# Patient Record
Sex: MALE | Race: WHITE | NOT HISPANIC OR LATINO | Employment: FULL TIME | RURAL
[De-identification: names, ages, dates, MRNs, and addresses within clinical notes are randomized per-mention and may not be internally consistent; named-entity substitution may affect disease eponyms.]

---

## 2022-06-30 ENCOUNTER — OFFICE VISIT (OUTPATIENT)
Dept: FAMILY MEDICINE CLINIC | Facility: CLINIC | Age: 22
End: 2022-06-30

## 2022-06-30 VITALS
SYSTOLIC BLOOD PRESSURE: 110 MMHG | OXYGEN SATURATION: 98 % | WEIGHT: 281 LBS | DIASTOLIC BLOOD PRESSURE: 80 MMHG | HEIGHT: 71 IN | BODY MASS INDEX: 39.34 KG/M2 | HEART RATE: 70 BPM

## 2022-06-30 DIAGNOSIS — R07.9 CHEST PAIN, UNSPECIFIED TYPE: ICD-10-CM

## 2022-06-30 DIAGNOSIS — R17 ELEVATED BILIRUBIN: Primary | ICD-10-CM

## 2022-06-30 PROCEDURE — 99203 OFFICE O/P NEW LOW 30 MIN: CPT | Performed by: PHYSICIAN ASSISTANT

## 2022-06-30 RX ORDER — DICLOFENAC SODIUM 75 MG/1
TABLET, DELAYED RELEASE ORAL
COMMUNITY
Start: 2022-06-25 | End: 2022-08-01

## 2022-06-30 NOTE — PROGRESS NOTES
"Chief Complaint  ER Follow Up    Subjective          Fish Paris presents to [facility]  Patient today for follow-up from his ER visit on 6/25/2022.  He went in for chest pain overall body aches, headache, and some numbness intermittently into right hand.  His labs were unremarkable except for minimal elevation to white count 1.3 and minimal elevation to bilirubin at 1.2.  EKG was unremarkable he was negative for COVID as well as mono testing.  He was given diclofenac to use as needed and he states that has helped his overall muscle aches.  He has continued to have some intermittent chest pain.  Denies shortness of breath.  Does have significant family history of heart disease with MI in his mom less than age of 50 and also MI in his maternal grandfather less than age of 40.  Patient does not smoke but currently vapes.  Denies any nausea, vomiting, or diarrhea.  Denies any dysuria.      Objective   Vital Signs:   /80   Pulse 70   Ht 180.3 cm (71\")   Wt 127 kg (281 lb)   SpO2 98%   BMI 39.19 kg/m²     Body mass index is 39.19 kg/m².    Review of Systems   Constitutional: Negative for chills and fever.   HENT: Negative for congestion and sore throat.    Respiratory: Negative for cough and shortness of breath.    Cardiovascular: Positive for chest pain. Negative for palpitations and leg swelling.   Gastrointestinal: Negative for abdominal pain, blood in stool, diarrhea, nausea and vomiting.   Genitourinary: Negative for dysuria and frequency.   Neurological: Negative for dizziness and headache.       Past History:  Medical History: has no past medical history on file.   Surgical History: has no past surgical history on file.   Family History: family history includes Depression in his father and mother; Hyperlipidemia in his father and mother; Hypertension in his father; Thyroid disease in his mother.   Social History: reports that he has never smoked. He uses smokeless " tobacco.      Current Outpatient Medications:   •  diclofenac (VOLTAREN) 75 MG EC tablet, , Disp: , Rfl:   Allergies: Patient has no known allergies.    Physical Exam  Constitutional:       Appearance: Normal appearance.   HENT:      Right Ear: Tympanic membrane normal.      Left Ear: Tympanic membrane normal.      Mouth/Throat:      Pharynx: Oropharynx is clear.   Eyes:      Conjunctiva/sclera: Conjunctivae normal.      Pupils: Pupils are equal, round, and reactive to light.   Cardiovascular:      Rate and Rhythm: Normal rate and regular rhythm.      Heart sounds: Normal heart sounds.   Pulmonary:      Effort: Pulmonary effort is normal.      Breath sounds: Normal breath sounds.   Abdominal:      Palpations: Abdomen is soft.      Tenderness: There is no abdominal tenderness.   Neurological:      Mental Status: He is oriented to person, place, and time.   Psychiatric:         Mood and Affect: Mood normal.         Behavior: Behavior normal.             Assessment and Plan   Diagnoses and all orders for this visit:    1. Elevated bilirubin (Primary)  -     CBC & Differential; Future  -     Comprehensive Metabolic Panel; Future  -     CBC & Differential  -     Comprehensive Metabolic Panel  We will recheck his bilirubin and CBC today in the office.  Encouraged patient to monitor his symptoms.  Discussed could be viral in nature.  If symptoms persist or progress, return to clinic or to ER if needed.  2. Chest pain, unspecified type  -     Ambulatory Referral to Cardiology  With continued intermittent chest pains along with family history of heart disease,  will set up with cardiology for further evaluation. RTC or ER prior if needed.       Follow Up   Return if symptoms worsen or fail to improve.  Patient was given instructions and counseling regarding his condition or for health maintenance advice. Please see specific information pulled into the AVS if appropriate.     Linda Fraire PA-C

## 2022-07-01 ENCOUNTER — TELEPHONE (OUTPATIENT)
Dept: CARDIOLOGY | Facility: CLINIC | Age: 22
End: 2022-07-01

## 2022-07-01 LAB
ALBUMIN SERPL-MCNC: 4.1 G/DL (ref 4.1–5.2)
ALBUMIN/GLOB SERPL: 1.5 {RATIO} (ref 1.2–2.2)
ALP SERPL-CCNC: 95 IU/L (ref 44–121)
ALT SERPL-CCNC: 18 IU/L (ref 0–44)
AST SERPL-CCNC: 19 IU/L (ref 0–40)
BASOPHILS # BLD AUTO: 0 X10E3/UL (ref 0–0.2)
BASOPHILS NFR BLD AUTO: 0 %
BILIRUB SERPL-MCNC: 0.3 MG/DL (ref 0–1.2)
BUN SERPL-MCNC: 10 MG/DL (ref 6–20)
BUN/CREAT SERPL: 13 (ref 9–20)
CALCIUM SERPL-MCNC: 9.5 MG/DL (ref 8.7–10.2)
CHLORIDE SERPL-SCNC: 105 MMOL/L (ref 96–106)
CO2 SERPL-SCNC: 24 MMOL/L (ref 20–29)
CREAT SERPL-MCNC: 0.79 MG/DL (ref 0.76–1.27)
EGFRCR SERPLBLD CKD-EPI 2021: 128 ML/MIN/1.73
EOSINOPHIL # BLD AUTO: 0.1 X10E3/UL (ref 0–0.4)
EOSINOPHIL NFR BLD AUTO: 1 %
ERYTHROCYTE [DISTWIDTH] IN BLOOD BY AUTOMATED COUNT: 12.9 % (ref 11.6–15.4)
GLOBULIN SER CALC-MCNC: 2.7 G/DL (ref 1.5–4.5)
GLUCOSE SERPL-MCNC: 88 MG/DL (ref 65–99)
HCT VFR BLD AUTO: 44.9 % (ref 37.5–51)
HGB BLD-MCNC: 15.7 G/DL (ref 13–17.7)
IMM GRANULOCYTES # BLD AUTO: 0 X10E3/UL (ref 0–0.1)
IMM GRANULOCYTES NFR BLD AUTO: 1 %
LYMPHOCYTES # BLD AUTO: 2 X10E3/UL (ref 0.7–3.1)
LYMPHOCYTES NFR BLD AUTO: 24 %
MCH RBC QN AUTO: 29.2 PG (ref 26.6–33)
MCHC RBC AUTO-ENTMCNC: 35 G/DL (ref 31.5–35.7)
MCV RBC AUTO: 84 FL (ref 79–97)
MONOCYTES # BLD AUTO: 0.5 X10E3/UL (ref 0.1–0.9)
MONOCYTES NFR BLD AUTO: 6 %
NEUTROPHILS # BLD AUTO: 5.4 X10E3/UL (ref 1.4–7)
NEUTROPHILS NFR BLD AUTO: 68 %
PLATELET # BLD AUTO: 318 X10E3/UL (ref 150–450)
POTASSIUM SERPL-SCNC: 4.3 MMOL/L (ref 3.5–5.2)
PROT SERPL-MCNC: 6.8 G/DL (ref 6–8.5)
RBC # BLD AUTO: 5.38 X10E6/UL (ref 4.14–5.8)
SODIUM SERPL-SCNC: 144 MMOL/L (ref 134–144)
WBC # BLD AUTO: 8 X10E3/UL (ref 3.4–10.8)

## 2022-07-01 NOTE — TELEPHONE ENCOUNTER
LEFT MESSAGE TO SCHEDULE NEW PATIENT VISIT WITH NICOLE MICHAELS NEXT WEEK. HUB MAY SCHEDULE WITH NICOLE MICHAELS OR TRANSFER TO OFFICE

## 2022-08-01 ENCOUNTER — TELEMEDICINE (OUTPATIENT)
Dept: FAMILY MEDICINE CLINIC | Facility: CLINIC | Age: 22
End: 2022-08-01

## 2022-08-01 VITALS — WEIGHT: 275 LBS | HEIGHT: 70 IN | BODY MASS INDEX: 39.37 KG/M2

## 2022-08-01 DIAGNOSIS — Z79.899 HIGH RISK MEDICATION USE: Primary | ICD-10-CM

## 2022-08-01 DIAGNOSIS — F33.1 MAJOR DEPRESSIVE DISORDER, RECURRENT, MODERATE: ICD-10-CM

## 2022-08-01 DIAGNOSIS — F41.9 ANXIETY: ICD-10-CM

## 2022-08-01 PROCEDURE — 99213 OFFICE O/P EST LOW 20 MIN: CPT | Performed by: PHYSICIAN ASSISTANT

## 2022-08-01 RX ORDER — ESCITALOPRAM OXALATE 10 MG/1
10 TABLET ORAL DAILY
Qty: 30 TABLET | Refills: 0 | Status: SHIPPED | OUTPATIENT
Start: 2022-08-01 | End: 2022-08-31 | Stop reason: SDUPTHER

## 2022-08-01 RX ORDER — HYDROXYZINE HYDROCHLORIDE 10 MG/1
TABLET, FILM COATED ORAL
Qty: 20 TABLET | Refills: 0 | Status: SHIPPED | OUTPATIENT
Start: 2022-08-01
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
## 2022-08-02 ENCOUNTER — TELEPHONE (OUTPATIENT)
Dept: FAMILY MEDICINE CLINIC | Facility: CLINIC | Age: 22
End: 2022-08-02

## 2022-08-02 ENCOUNTER — OFFICE VISIT (OUTPATIENT)
Dept: FAMILY MEDICINE CLINIC | Facility: CLINIC | Age: 22
End: 2022-08-02

## 2022-08-02 DIAGNOSIS — J06.9 ACUTE URI: Primary | ICD-10-CM

## 2022-08-02 LAB
EXPIRATION DATE: ABNORMAL
FLUAV AG UPPER RESP QL IA.RAPID: NOT DETECTED
FLUBV AG UPPER RESP QL IA.RAPID: NOT DETECTED
INTERNAL CONTROL: ABNORMAL
Lab: ABNORMAL
SARS-COV-2 RNA RESP QL NAA+PROBE: DETECTED

## 2022-08-02 PROCEDURE — 87428 SARSCOV & INF VIR A&B AG IA: CPT | Performed by: NURSE PRACTITIONER

## 2022-08-02 PROCEDURE — 99213 OFFICE O/P EST LOW 20 MIN: CPT | Performed by: NURSE PRACTITIONER

## 2022-08-02 NOTE — ASSESSMENT & PLAN NOTE
Flu negative.  COVID-positive.  Informed patient to isolate for the next 5 days.  Tylenol and ibuprofen as needed pain fever.  Mucinex DM as needed cough congestion.  Fluids and rest encouraged.  Return in 5 days if no improvement, sooner if worsens.  Monitor O2 levels and go to the ED if gets below 93% or if develops severe symptoms of chest pain chest pressure shortness of breath or trouble breathing.  Return to clinic or ED with any issues or concerns.

## 2022-08-02 NOTE — PROGRESS NOTES
You have chosen to receive care through a telehealth visit.  Do you consent to use a video/audio connection for your medical care today? Yes     This was an audio and video enabled telemedicine encounter.     Chief Complaint  Cough    Subjective          Fish Paris presents to [facility]  History of Present Illness     Dates yesterday he started having chills fatigue and cough.  States his wife tested positive for COVID yesterday.  He has not been vaccinated.  No chest pain no chest pressure no shortness of breath no trouble breathing.        Objective   Vital Signs:   There were no vitals taken for this visit.    There is no height or weight on file to calculate BMI.    Review of Systems   Constitutional: Positive for chills and fatigue.   HENT: Positive for congestion.    Eyes: Negative for visual disturbance.   Respiratory: Positive for cough. Negative for shortness of breath and wheezing.    Cardiovascular: Negative for chest pain and leg swelling.   Gastrointestinal: Negative for abdominal pain, diarrhea, nausea and vomiting.   Genitourinary: Negative for dysuria.   Musculoskeletal: Negative for back pain.   Neurological: Negative for dizziness and headache.   Psychiatric/Behavioral: Negative for suicidal ideas.       Past History:  Medical History: has no past medical history on file.   Surgical History: has no past surgical history on file.   Family History: family history includes Depression in his father and mother; Hyperlipidemia in his father and mother; Hypertension in his father; Thyroid disease in his mother.   Social History: reports that he quit smoking about 4 years ago. His smoking use included cigarettes. He started smoking about 7 years ago. He smoked 0.50 packs per day. He has never used smokeless tobacco. He reports current alcohol use. He reports current drug use. Drug: Marijuana.    PHQ-2 Depression Screening  Little interest or pleasure in doing things?      Feeling down, depressed, or hopeless?     PHQ-2 Total Score          PHQ-9 Depression Screening  Little interest or pleasure in doing things?     Feeling down, depressed, or hopeless?     Trouble falling or staying asleep, or sleeping too much?     Feeling tired or having little energy?     Poor appetite or overeating?     Feeling bad about yourself - or that you are a failure or have let yourself or your family down?     Trouble concentrating on things, such as reading the newspaper or watching television?     Moving or speaking so slowly that other people could have noticed? Or the opposite - being so fidgety or restless that you have been moving around a lot more than usual?     Thoughts that you would be better off dead, or of hurting yourself in some way?     PHQ-9 Total Score     If you checked off any problems, how difficult have these problems made it for you to do your work, take care of things at home, or get along with other people?       PHQ-9 Total Score:              Current Outpatient Medications:   •  escitalopram (Lexapro) 10 MG tablet, Take 1 tablet by mouth Daily., Disp: 30 tablet, Rfl: 0  •  hydrOXYzine (ATARAX) 10 MG tablet, Take one tablet by mouth twice a day, as needed for anxiety, Disp: 20 tablet, Rfl: 0   (Not in a hospital admission)     Allergies: Patient has no known allergies.    Physical Exam  Constitutional:       Appearance: Normal appearance.   Pulmonary:      Effort: Pulmonary effort is normal.   Neurological:      General: No focal deficit present.      Mental Status: He is alert and oriented to person, place, and time. Mental status is at baseline.   Psychiatric:         Mood and Affect: Mood normal.         Behavior: Behavior normal.         Thought Content: Thought content normal.         Judgment: Judgment normal.          Result Review :                   Assessment and Plan    Diagnoses and all orders for this visit:    1. Acute URI (Primary)  Assessment & Plan:  Flu  negative.  COVID-positive.  Informed patient to isolate for the next 5 days.  Tylenol and ibuprofen as needed pain fever.  Mucinex DM as needed cough congestion.  Fluids and rest encouraged.  Return in 5 days if no improvement, sooner if worsens.  Monitor O2 levels and go to the ED if gets below 93% or if develops severe symptoms of chest pain chest pressure shortness of breath or trouble breathing.  Return to clinic or ED with any issues or concerns.    Orders:  -     Covid-19 + Flu A&B AG, Veritor                   Follow Up   Return if symptoms worsen or fail to improve.  Patient was given instructions and counseling regarding his condition or for health maintenance advice. Please see specific information pulled into the AVS if appropriate.     RADHA Briseno

## 2022-08-09 ENCOUNTER — TELEPHONE (OUTPATIENT)
Dept: FAMILY MEDICINE CLINIC | Facility: CLINIC | Age: 22
End: 2022-08-09

## 2022-08-09 NOTE — TELEPHONE ENCOUNTER
Hub staff attempted to follow warm transfer process  and was unsuccessful     Caller: Fish Paris    Relationship to patient: Self    Best call back number: [phone]    Patient is needing: YES

## 2022-08-10 NOTE — TELEPHONE ENCOUNTER
Patients does not want to  paperwork. He asked that we just send it to [facility] and scan into his chart.

## 2022-08-31 RX ORDER — ESCITALOPRAM OXALATE 10 MG/1
10 TABLET ORAL DAILY
Qty: 30 TABLET | Refills: 0 | Status: SHIPPED | OUTPATIENT
Start: 2022-08-31 | End: 2022-09-29 | Stop reason: SDUPTHER

## 2022-09-02 ENCOUNTER — TELEPHONE (OUTPATIENT)
Dept: FAMILY MEDICINE CLINIC | Facility: CLINIC | Age: 22
End: 2022-09-02

## 2022-09-29 RX ORDER — ESCITALOPRAM OXALATE 10 MG/1
10 TABLET ORAL DAILY
Qty: 14 TABLET | Refills: 0 | Status: SHIPPED | OUTPATIENT
Start: 2022-09-29 | End: 2022-10-14 | Stop reason: SDUPTHER

## 2022-09-29 NOTE — TELEPHONE ENCOUNTER
Incoming Refill Request      Medication requested (name and dose): escitalopram (Lexapro) 10 MG tablet    Pharmacy where request should be sent: [facility], [place]    Additional details provided by patient: PATIENT HAS ONE PILL LEFT    Best call back number: [phone]    Does the patient have less than a 3 day supply:  [x] Yes  [] No    Gavino Ho Rep  09/29/22, 10:00 EDT

## 2022-09-29 NOTE — TELEPHONE ENCOUNTER
He was to f/up with behavioral health-- if not, will send 2 weeks of medication but will need f/up appt for further refill; thanks

## 2022-09-30 ENCOUNTER — TELEPHONE (OUTPATIENT)
Dept: FAMILY MEDICINE CLINIC | Facility: CLINIC | Age: 22
End: 2022-09-30

## 2022-09-30 NOTE — TELEPHONE ENCOUNTER
Left message   Hub to read: does he have appt with behavioral health? If not he needs to make one and she can send two weeks in the mean while

## 2022-09-30 NOTE — TELEPHONE ENCOUNTER
PATIENT MADE CONTACT TO RETURN A MISSED CALL FROM THE OFFICE    HUB RELAYED MESSAGE AS GIVEN:    Pratibha Crum MA     BC    12:01 PM  Note    Left message   Hub to read: does he have appt with behavioral health? If not he needs to make one and she can send two weeks in the mean while        PATIENT UNDERSTOOD THE MESSAGE AND STATED HE DID  THE MEDICATION PRESCRIBED BY EVANS SUAREZ THIS MORNING    HUB TRANSFERRED PATIENT TO OFFICE TO SCHEDULE BEHAVIORAL HEALTH AS MENTIONED IN MESSAGE

## 2022-09-30 NOTE — TELEPHONE ENCOUNTER
Pt called back, read him message from Dr Fraire.  Made him appt for later in October to be seen for Medication Refill.

## 2022-10-14 RX ORDER — ESCITALOPRAM OXALATE 10 MG/1
10 TABLET ORAL DAILY
Qty: 14 TABLET | Refills: 0 | Status: SHIPPED | OUTPATIENT
Start: 2022-10-14 | End: 2022-10-21 | Stop reason: SDUPTHER

## 2022-10-21 ENCOUNTER — OFFICE VISIT (OUTPATIENT)
Dept: FAMILY MEDICINE CLINIC | Facility: CLINIC | Age: 22
End: 2022-10-21

## 2022-10-21 VITALS
HEIGHT: 71 IN | WEIGHT: 276 LBS | BODY MASS INDEX: 38.64 KG/M2 | HEART RATE: 70 BPM | OXYGEN SATURATION: 98 % | SYSTOLIC BLOOD PRESSURE: 122 MMHG | DIASTOLIC BLOOD PRESSURE: 80 MMHG

## 2022-10-21 DIAGNOSIS — F41.9 ANXIETY: ICD-10-CM

## 2022-10-21 DIAGNOSIS — F33.1 MAJOR DEPRESSIVE DISORDER, RECURRENT, MODERATE: ICD-10-CM

## 2022-10-21 DIAGNOSIS — Z79.899 HIGH RISK MEDICATION USE: Primary | ICD-10-CM

## 2022-10-21 PROCEDURE — 99213 OFFICE O/P EST LOW 20 MIN: CPT | Performed by: PHYSICIAN ASSISTANT

## 2022-10-21 RX ORDER — ESCITALOPRAM OXALATE 10 MG/1
10 TABLET ORAL DAILY
Qty: 30 TABLET | Refills: 1 | Status: SHIPPED | OUTPATIENT
Start: 2022-10-21 | End: 2022-12-23 | Stop reason: SDUPTHER

## 2022-10-21 NOTE — PROGRESS NOTES
"Chief Complaint  Med Refill    Subjective          Fish Paris presents to [facility]  History of Present Illness  Patient in today for medication recheck and refills.  He has an upcoming appointment with behavioral health to further discuss his depression and  anxiety symptoms but will be running out of Lexapro prior to seeing them.  He does feel like the Lexapro has helped some and would like to continue until he can follow-up with them.  Denies any side effects of medication.  Denies any chest pain or shortness of breath.  Denies any SI/HI.  Depression  Visit Type: follow-up  Patient presents with the following symptoms: depressed mood and nervousness/anxiety.  Patient is not experiencing: chest pain, irritability, nausea, palpitations, panic, shortness of breath, suicidal planning and thoughts of death.    Anxiety  Presents for follow-up visit. Symptoms include depressed mood and nervous/anxious behavior. Patient reports no chest pain, dizziness, irritability, nausea, palpitations, panic or shortness of breath.     His past medical history is significant for depression.       Objective   Vital Signs:   /80   Pulse 70   Ht 180.3 cm (71\")   Wt 125 kg (276 lb)   SpO2 98%   BMI 38.49 kg/m²     Body mass index is 38.49 kg/m².    Review of Systems   Constitutional: Negative for fatigue and irritability.   Respiratory: Negative for shortness of breath.    Cardiovascular: Negative for chest pain and palpitations.   Gastrointestinal: Negative for diarrhea, nausea and vomiting.   Neurological: Negative for dizziness and headache.   Psychiatric/Behavioral: Positive for depressed mood. The patient is nervous/anxious.        Past History:  Medical History: has no past medical history on file.   Surgical History: has no past surgical history on file.   Family History: family history includes Depression in his father and mother; Hyperlipidemia in his father and mother; Hypertension in " his father; Thyroid disease in his mother.   Social History: reports that he quit smoking about 4 years ago. His smoking use included cigarettes. He started smoking about 7 years ago. He smoked an average of .5 packs per day. He has never used smokeless tobacco. He reports current alcohol use. He reports current drug use. Drug: Marijuana.      Current Outpatient Medications:   •  escitalopram (Lexapro) 10 MG tablet, Take 1 tablet by mouth Daily., Disp: 30 tablet, Rfl: 1  •  hydrOXYzine (ATARAX) 10 MG tablet, Take one tablet by mouth twice a day, as needed for anxiety, Disp: 20 tablet, Rfl: 0  Allergies: Patient has no known allergies.    Physical Exam  Constitutional:       Appearance: Normal appearance.   HENT:      Right Ear: Tympanic membrane normal.      Left Ear: Tympanic membrane normal.      Mouth/Throat:      Pharynx: Oropharynx is clear.   Eyes:      Conjunctiva/sclera: Conjunctivae normal.      Pupils: Pupils are equal, round, and reactive to light.   Cardiovascular:      Rate and Rhythm: Normal rate and regular rhythm.      Heart sounds: Normal heart sounds.   Pulmonary:      Effort: Pulmonary effort is normal.      Breath sounds: Normal breath sounds.   Abdominal:      Tenderness: There is no abdominal tenderness.   Neurological:      Mental Status: He is oriented to person, place, and time.   Psychiatric:         Mood and Affect: Mood normal.         Behavior: Behavior normal.             Assessment and Plan   Diagnoses and all orders for this visit:    1. High risk medication use (Primary)  Will refill Lexapro at this time until he can follow-up with behavioral health.  Encouraged healthy diet and exercise and patient to monitor blood pressure.  Return to clinic prior to recheck as needed.  2. Major depressive disorder, recurrent, moderate (HCC)    3. Anxiety    Other orders  -     escitalopram (Lexapro) 10 MG tablet; Take 1 tablet by mouth Daily.  Dispense: 30 tablet; Refill: 1            Follow Up    No follow-ups on file.  Patient was given instructions and counseling regarding his condition or for health maintenance advice. Please see specific information pulled into the AVS if appropriate.     Linda Fraire PA-C

## 2022-11-29 ENCOUNTER — OFFICE VISIT (OUTPATIENT)
Dept: PSYCHIATRY | Facility: CLINIC | Age: 22
End: 2022-11-29

## 2022-11-29 DIAGNOSIS — F33.0 MILD EPISODE OF RECURRENT MAJOR DEPRESSIVE DISORDER: ICD-10-CM

## 2022-11-29 DIAGNOSIS — F41.1 GENERALIZED ANXIETY DISORDER: Primary | ICD-10-CM

## 2022-11-29 PROCEDURE — 90791 PSYCH DIAGNOSTIC EVALUATION: CPT | Performed by: COUNSELOR
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
## 2022-12-23 ENCOUNTER — TELEMEDICINE (OUTPATIENT)
Dept: PSYCHIATRY | Facility: CLINIC | Age: 22
End: 2022-12-23

## 2022-12-23 DIAGNOSIS — F41.1 GENERALIZED ANXIETY DISORDER: Primary | ICD-10-CM

## 2022-12-23 DIAGNOSIS — F33.0 MILD EPISODE OF RECURRENT MAJOR DEPRESSIVE DISORDER: ICD-10-CM

## 2022-12-23 PROCEDURE — 90792 PSYCH DIAG EVAL W/MED SRVCS: CPT | Performed by: NURSE PRACTITIONER

## 2022-12-23 RX ORDER — ESCITALOPRAM OXALATE 10 MG/1
10 TABLET ORAL DAILY
Qty: 30 TABLET | Refills: 5 | Status: SHIPPED | OUTPATIENT
Start: 2022-12-23
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
## 2023-01-25 ENCOUNTER — TELEMEDICINE (OUTPATIENT)
Dept: PSYCHIATRY | Facility: CLINIC | Age: 23
End: 2023-01-25
Payer: MEDICAID

## 2023-01-25 DIAGNOSIS — F33.0 MILD EPISODE OF RECURRENT MAJOR DEPRESSIVE DISORDER: ICD-10-CM

## 2023-01-25 DIAGNOSIS — F41.1 GENERALIZED ANXIETY DISORDER: Primary | ICD-10-CM

## 2023-01-25 PROCEDURE — 90837 PSYTX W PT 60 MINUTES: CPT | Performed by: COUNSELOR

## 2023-01-25 NOTE — PROGRESS NOTES
Telehealth Encounter Note:  Date of Service: 2023  Patient Name: Fish Paris  : 2000   MRN: [id number]   Time In: 1:26 PM  Time Out: 2:28 PM    This provider is located at [facility] [address], [place] [address] using a secure [employer] Video Visit through [employer]. Patient is being seen remotely via telehealth at home address in [place] and stated they are in a secure environment for this session. The patient's condition being diagnosed/treated is appropriate for telemedicine. The provider identified herself as well as her credentials. The patient, and/or patients guardian, consent to be seen remotely, and when consent is given they understand that the consent allows for patient identifiable information to be sent to a third party as needed. They may refuse to be seen remotely at any time. The electronic data is encrypted and password protected, and the patient and/or guardian has been advised of the potential risks to privacy not withstanding such measures.     You have chosen to receive care through a telehealth visit.  Do you consent to use a video/audio connection for your medical care today? Yes    Referring Provider: Linda Fraire PA-C    PROGRESS NOTE    History of Present Illness:   Fish Paris is a 22 y.o. male who is being seen today for follow up individual Psychotherapy session.     Chief Complaint: Pt struggles with anxiety and depression.  Pt reports little interest/pleasure in doing things, feeling down, little energy, feeling bad about himself, trouble concentrating, feeling anxious/nervous, unable to control worries, worrying too much about different things, trouble relaxing, easily irritated, trouble falling asleep, poor appetite.  Pt has a hx of SI without intent or planning.  Pt reports he is more stable after starting Lexapro.  Pt is now attending our clinic for med mgt.  Pt had a few down days this week after missing one day of meds.  Pt reports  anxiety is more stable as long as he doesn't miss his meds.  Pt only has high anxiety if he meds are missed or if something situational happens.  Pt is looking for a new job with [employer] due to his boss being medically ill and work slowing.  Pt reports he does struggle with going to work due to being triggered by friends death as they used to work together.  Going to work causes pt to think about him and having panic attacks.  This has caused pt to switch multiple jobs and sold the truck he jumped out of.  Pt isolates more since the accident.          ICD-10-CM ICD-9-CM   1. Generalized anxiety disorder  F41.1 300.02   2. Mild episode of recurrent major depressive disorder (HCC)  F33.0 296.31        Clinical Maneuvering/Intervention:   Assisted patient in processing above session content; acknowledged and normalized patient’s thoughts, feelings, and concerns by utilizing a person-centered approach in efforts to build appropriate rapport and a positive therapeutic relationship with open and honest communication.  Processed and rationalized patients thoughts and feelings regarding mental health and current functioning, normal life stressors (changing jobs).  Discussed triggers associated with patient's anxiety/low moods.  Also discussed coping skills for patient to implement such as leaning on positive supports, engaging in hobbies for pleasure and relaxation skills for mood maintenance.  Pt spends time with his family, had a date night with his wife, working on cars, getting outside and purchased two dogs as a means for coping/enjoyment. Pt appears to be improving since initial intake.  Next session will assess for more specific PTSD symptoms.       Allowed patient to freely discuss issues without interruption or judgment. Provided safe, confidential environment to facilitate the development of positive therapeutic relationship and encourage open, honest communication. Assisted patient in identifying risk factors which  would indicate the need for higher level of care including thoughts to harm self or others and/or self-harming behavior and encouraged patient to contact this office, call 911, or present to the nearest emergency room should any of these events occur. Discussed crisis intervention services and means to access. Patient adamantly and convincingly denies current suicidal or homicidal ideation or perceptual disturbance.    Mental Status Exam:   Hygiene:   good  Cooperation:  Cooperative  Eye Contact:  Good  Psychomotor Behavior:  Appropriate  Affect:  Appropriate  Mood: normal  Speech:  Normal  Thought Process:  Goal directed and Linear  Thought Content:  Normal  Suicidal:  None  Homicidal:  None  Hallucinations:  None  Delusion:  None  Memory:  Intact  Orientation:  Person, Place, Time and Situation  Reliability:  good  Insight:  Good  Judgement:  Good  Impulse Control:  Good  Physical/Medical Issues:  No      Patient's Support Network Includes:  wife and extended family    Functional Status: Moderate impairment     Progress toward goal: Not at goal    Prognosis: Good with Ongoing Treatment     Medications:     Current Outpatient Medications:   •  escitalopram (Lexapro) 10 MG tablet, Take 1 tablet by mouth Daily., Disp: 30 tablet, Rfl: 5  •  hydrOXYzine (ATARAX) 10 MG tablet, Take one tablet by mouth twice a day, as needed for anxiety, Disp: 20 tablet, Rfl: 0    Visit Diagnosis/Orders Placed This Visit:    ICD-10-CM ICD-9-CM   1. Generalized anxiety disorder  F41.1 300.02   2. Mild episode of recurrent major depressive disorder (HCC)  F33.0 296.31        PLAN:  1. Safety: No acute safety concerns  2. Risk Assessment: Risk of self-harm acutely is low. Risk of self-harm chronically is also low, but could be further elevated in the event of treatment noncompliance and/or AODA.    Crisis Plan:  Symptoms and/or behaviors to indicate a crisis: Excessive worry or fear, Feeling sad or low and Thinking about suicide    What  calming techniques or other strategies will patient use to de-escalate and stay safe: slow down, breathe, visualize calming self, think it though, listen to music, change focus, take a walk    Who is one person patient can contact to assist with de-escalation? Wife    Treatment Plan/Goals: Patient will continue supportive psychotherapy efforts and medication regimen as prescribed. Therapist will provide Cognitive Behavioral Therapy to assist patient in improving functioning and gaining coping skills, maintaining stability, and avoiding decompensation and the need for higher level of care. Plan for treatment was discussed during today's visit. Patient acknowledged and verbally consented to continue with current treatment plan and was educated on the importance of compliance with treatment and follow-up appointments.     Patient will contact this office, call 911 or present to the nearest emergency room should suicidal or homicidal ideations occur.     Follow Up:   Return in about 4 weeks (around 2/22/2023) for Therapy session.      ENRRIQUE Kemp   [facility]     This document has been electronically signed by ENRRIQUE Kemp   January 25, 2023 14:01 EST

## 2023-08-17 ENCOUNTER — OFFICE VISIT (OUTPATIENT)
Dept: FAMILY MEDICINE CLINIC | Facility: CLINIC | Age: 23
End: 2023-08-17
Payer: MEDICAID

## 2023-08-17 VITALS
DIASTOLIC BLOOD PRESSURE: 80 MMHG | BODY MASS INDEX: 41.16 KG/M2 | HEART RATE: 74 BPM | WEIGHT: 294 LBS | HEIGHT: 71 IN | OXYGEN SATURATION: 97 % | SYSTOLIC BLOOD PRESSURE: 124 MMHG

## 2023-08-17 DIAGNOSIS — K42.9 UMBILICAL HERNIA WITHOUT OBSTRUCTION AND WITHOUT GANGRENE: ICD-10-CM

## 2023-08-17 DIAGNOSIS — K21.9 GASTROESOPHAGEAL REFLUX DISEASE, UNSPECIFIED WHETHER ESOPHAGITIS PRESENT: ICD-10-CM

## 2023-08-17 DIAGNOSIS — R10.84 GENERALIZED ABDOMINAL PAIN: Primary | ICD-10-CM

## 2023-08-17 PROCEDURE — 99214 OFFICE O/P EST MOD 30 MIN: CPT | Performed by: PHYSICIAN ASSISTANT

## 2023-08-17 RX ORDER — OMEPRAZOLE 40 MG/1
40 CAPSULE, DELAYED RELEASE ORAL DAILY
Qty: 30 CAPSULE | Refills: 0 | Status: SHIPPED | OUTPATIENT
Start: 2023-08-17

## 2023-08-17 NOTE — PROGRESS NOTES
"Chief Complaint  Abdominal Pain (Abdominal pain, acid reflux going on few months but has gotten worse )    Subjective          Fish Paris presents to [facility]  History of Present Illness  Patient in today for evaluation on reflux that he states has had for years but progressed over past 4+ months. Has been taking otc prilosec with minimal benefit- states trying to work on avoiding meds that aggravate and eating earlier. He also states has been noticing bulge to left of umbilical area intermittently. States on occasion has had some discomfort to area. No known injury to area.   Abdominal Pain  This is a recurrent problem. The current episode started 1 to 4 weeks ago. The onset quality is sudden. The problem occurs 2 to 4 times per day. The most recent episode lasted 8 hours. The problem has been gradually worsening. The pain is located in the epigastric region and generalized abdominal region. The pain is at a severity of 5/10. The quality of the pain is cramping. The abdominal pain radiates to the periumbilical region. Associated symptoms include anorexia, arthralgias, belching, diarrhea, flatus, frequency, headaches, myalgias, nausea and vomiting. Pertinent negatives include no constipation, dysuria, fever, hematochezia, hematuria, melena or weight loss. Nothing aggravates the pain. The pain is relieved by Bowel movements, passing flatus, recumbency and vomiting.     Objective   Vital Signs:   /80 (BP Location: Left arm, Patient Position: Sitting, Cuff Size: Large Adult)   Pulse 74   Ht 180.3 cm (71\")   Wt 133 kg (294 lb)   SpO2 97%   BMI 41.00 kg/mý     Body mass index is 41 kg/mý.    Review of Systems   Constitutional:  Negative for fever and unexpected weight loss.   Respiratory:  Negative for cough and shortness of breath.    Cardiovascular:  Negative for chest pain.   Gastrointestinal:  Positive for abdominal pain, anorexia, diarrhea, flatus, nausea and vomiting. " Negative for blood in stool, constipation, hematochezia and melena.   Genitourinary:  Positive for frequency. Negative for dysuria and hematuria.   Musculoskeletal:  Positive for arthralgias and myalgias.   Neurological:  Negative for headache.     Past History:  Medical History: has no past medical history on file.   Surgical History: has no past surgical history on file.   Family History: family history includes Depression in his father and mother; Hyperlipidemia in his father and mother; Hypertension in his father; Thyroid disease in his mother.   Social History: reports that he quit smoking about 5 years ago. His smoking use included cigarettes. He started smoking about 8 years ago. He smoked an average of .5 packs per day. He has never used smokeless tobacco. He reports current alcohol use. He reports current drug use. Drug: Marijuana.      Current Outpatient Medications:     omeprazole (priLOSEC) 40 MG capsule, Take 1 capsule by mouth Daily., Disp: 30 capsule, Rfl: 0  Allergies: Patient has no known allergies.    Physical Exam  Constitutional:       Appearance: Normal appearance.   HENT:      Right Ear: Tympanic membrane normal.      Left Ear: Tympanic membrane normal.      Mouth/Throat:      Pharynx: Oropharynx is clear.   Eyes:      Conjunctiva/sclera: Conjunctivae normal.      Pupils: Pupils are equal, round, and reactive to light.   Cardiovascular:      Rate and Rhythm: Normal rate and regular rhythm.      Heart sounds: Normal heart sounds.   Pulmonary:      Effort: Pulmonary effort is normal.      Breath sounds: Normal breath sounds.   Abdominal:      Palpations: Abdomen is soft.      Tenderness: There is no abdominal tenderness.      Comments: Slight bulge noted to left of umbilicus; nontender to palpate   Neurological:      Mental Status: He is oriented to person, place, and time.   Psychiatric:         Mood and Affect: Mood normal.         Behavior: Behavior normal.           Assessment and Plan    Diagnoses and all orders for this visit:    1. Generalized abdominal pain (Primary)  -     Cancel: CBC & Differential  -     Cancel: Comprehensive Metabolic Panel  -     Cancel: Amylase; Future  -     Cancel: Lipase; Future  -     CBC & Differential  -     Comprehensive Metabolic Panel  -     Amylase  -     Lipase  Will check labs today. Encouraged healthy diet and exercise. If any progression/worsening of pain to ER for further evaluation if needed.   2. Gastroesophageal reflux disease, unspecified whether esophagitis present  -     Ambulatory Referral to Gastroenterology  Will start on omeprazole 40mg once a day; avoid aggravating foods; avoid eating close to bedtime; he is interested in GI evaluation ; rtc prior as needed   3. Umbilical hernia without obstruction and without gangrene  -     Ambulatory Referral to General Surgery  Will set up with gen surgery for consult  Other orders  -     omeprazole (priLOSEC) 40 MG capsule; Take 1 capsule by mouth Daily.  Dispense: 30 capsule; Refill: 0            Follow Up   No follow-ups on file.  Patient was given instructions and counseling regarding his condition or for health maintenance advice. Please see specific information pulled into the AVS if appropriate.     Linda Fraire PA-C

## 2023-08-18 LAB
ALBUMIN SERPL-MCNC: 4.6 G/DL (ref 4.3–5.2)
ALBUMIN/GLOB SERPL: 2 {RATIO} (ref 1.2–2.2)
ALP SERPL-CCNC: 113 IU/L (ref 44–121)
ALT SERPL-CCNC: 28 IU/L (ref 0–44)
AMYLASE SERPL-CCNC: 87 U/L (ref 31–110)
AST SERPL-CCNC: 24 IU/L (ref 0–40)
BASOPHILS # BLD AUTO: 0 X10E3/UL (ref 0–0.2)
BASOPHILS NFR BLD AUTO: 1 %
BILIRUB SERPL-MCNC: 0.3 MG/DL (ref 0–1.2)
BUN SERPL-MCNC: 11 MG/DL (ref 6–20)
BUN/CREAT SERPL: 14 (ref 9–20)
CALCIUM SERPL-MCNC: 9.7 MG/DL (ref 8.7–10.2)
CHLORIDE SERPL-SCNC: 104 MMOL/L (ref 96–106)
CO2 SERPL-SCNC: 22 MMOL/L (ref 20–29)
CREAT SERPL-MCNC: 0.8 MG/DL (ref 0.76–1.27)
EGFRCR SERPLBLD CKD-EPI 2021: 127 ML/MIN/1.73
EOSINOPHIL # BLD AUTO: 0.3 X10E3/UL (ref 0–0.4)
EOSINOPHIL NFR BLD AUTO: 4 %
ERYTHROCYTE [DISTWIDTH] IN BLOOD BY AUTOMATED COUNT: 12.8 % (ref 11.6–15.4)
GLOBULIN SER CALC-MCNC: 2.3 G/DL (ref 1.5–4.5)
GLUCOSE SERPL-MCNC: 103 MG/DL (ref 70–99)
HCT VFR BLD AUTO: 46.8 % (ref 37.5–51)
HGB BLD-MCNC: 15.8 G/DL (ref 13–17.7)
IMM GRANULOCYTES # BLD AUTO: 0 X10E3/UL (ref 0–0.1)
IMM GRANULOCYTES NFR BLD AUTO: 1 %
LIPASE SERPL-CCNC: 30 U/L (ref 13–78)
LYMPHOCYTES # BLD AUTO: 1.3 X10E3/UL (ref 0.7–3.1)
LYMPHOCYTES NFR BLD AUTO: 20 %
MCH RBC QN AUTO: 28.4 PG (ref 26.6–33)
MCHC RBC AUTO-ENTMCNC: 33.8 G/DL (ref 31.5–35.7)
MCV RBC AUTO: 84 FL (ref 79–97)
MONOCYTES # BLD AUTO: 0.6 X10E3/UL (ref 0.1–0.9)
MONOCYTES NFR BLD AUTO: 9 %
NEUTROPHILS # BLD AUTO: 4.4 X10E3/UL (ref 1.4–7)
NEUTROPHILS NFR BLD AUTO: 65 %
PLATELET # BLD AUTO: 270 X10E3/UL (ref 150–450)
POTASSIUM SERPL-SCNC: 4.6 MMOL/L (ref 3.5–5.2)
PROT SERPL-MCNC: 6.9 G/DL (ref 6–8.5)
RBC # BLD AUTO: 5.57 X10E6/UL (ref 4.14–5.8)
SODIUM SERPL-SCNC: 141 MMOL/L (ref 134–144)
WBC # BLD AUTO: 6.5 X10E3/UL (ref 3.4–10.8)

## 2023-08-21 ENCOUNTER — TELEPHONE (OUTPATIENT)
Dept: FAMILY MEDICINE CLINIC | Facility: CLINIC | Age: 23
End: 2023-08-21
Payer: MEDICAID

## 2023-08-21 NOTE — TELEPHONE ENCOUNTER
HUB TO READ RELAYED TO PATIENT. PATIENT STATES HE WAS NOT FASTING FOR HIS LABS. PATIENT UNDERSTOOD THE RESULTS.

## 2023-08-21 NOTE — TELEPHONE ENCOUNTER
LVM for pt to call back  HUB TO READ  Please let know labs were in normal range except for minimal elevation to sugar at 103, nml <100 if was fasting but not sure was fasting;  thanks

## 2023-08-22 ENCOUNTER — TELEPHONE (OUTPATIENT)
Dept: FAMILY MEDICINE CLINIC | Facility: CLINIC | Age: 23
End: 2023-08-22
Payer: MEDICAID

## 2023-09-11 ENCOUNTER — TELEPHONE (OUTPATIENT)
Dept: FAMILY MEDICINE CLINIC | Facility: CLINIC | Age: 23
End: 2023-09-11
Payer: MEDICAID

## 2023-09-11 RX ORDER — OMEPRAZOLE 40 MG/1
40 CAPSULE, DELAYED RELEASE ORAL DAILY
Qty: 30 CAPSULE | Refills: 0 | Status: SHIPPED | OUTPATIENT
Start: 2023-09-11

## 2023-09-11 NOTE — TELEPHONE ENCOUNTER
HUB TO READ  Letitia Andrea sent in one month of OMEPRAZOLE but pt had discussed seeing  GI at last visit- please see if needs us to schedule for him. If he does want us to schedule him with a GI, please send message to CLINICAL [facility]OL   [facility]

## 2023-09-11 NOTE — TELEPHONE ENCOUNTER
Sent x 1-  had discussed seeing  GI at last visit- please see if needs us to schedule for him; thanks

## 2023-10-06 RX ORDER — OMEPRAZOLE 40 MG/1
40 CAPSULE, DELAYED RELEASE ORAL DAILY
Qty: 30 CAPSULE | Refills: 1 | Status: SHIPPED | OUTPATIENT
Start: 2023-10-06

## 2024-02-09 RX ORDER — OMEPRAZOLE 40 MG/1
40 CAPSULE, DELAYED RELEASE ORAL DAILY
Qty: 30 CAPSULE | Refills: 0 | Status: SHIPPED | OUTPATIENT
Start: 2024-02-09

## 2024-02-12 NOTE — TELEPHONE ENCOUNTER
Hub relay: left message patient's medication has been refilled and they need to schedule an appointment with Letitia

## 2024-05-22 ENCOUNTER — OFFICE VISIT (OUTPATIENT)
Dept: FAMILY MEDICINE CLINIC | Facility: CLINIC | Age: 24
End: 2024-05-22
Payer: MEDICAID

## 2024-05-22 VITALS
OXYGEN SATURATION: 97 % | BODY MASS INDEX: 42.7 KG/M2 | WEIGHT: 305 LBS | SYSTOLIC BLOOD PRESSURE: 126 MMHG | HEART RATE: 87 BPM | HEIGHT: 71 IN | DIASTOLIC BLOOD PRESSURE: 72 MMHG

## 2024-05-22 DIAGNOSIS — L98.9 BENIGN SKIN GROWTH: ICD-10-CM

## 2024-05-22 DIAGNOSIS — R05.1 ACUTE COUGH: ICD-10-CM

## 2024-05-22 DIAGNOSIS — J06.9 ACUTE URI: Primary | ICD-10-CM

## 2024-05-22 LAB
EXPIRATION DATE: NORMAL
FLUAV AG UPPER RESP QL IA.RAPID: NOT DETECTED
FLUBV AG UPPER RESP QL IA.RAPID: NOT DETECTED
INTERNAL CONTROL: NORMAL
Lab: NORMAL
SARS-COV-2 AG UPPER RESP QL IA.RAPID: NOT DETECTED

## 2024-05-22 PROCEDURE — 87428 SARSCOV & INF VIR A&B AG IA: CPT | Performed by: PHYSICIAN ASSISTANT

## 2024-05-22 PROCEDURE — 99213 OFFICE O/P EST LOW 20 MIN: CPT | Performed by: PHYSICIAN ASSISTANT

## 2024-05-22 RX ORDER — KETOTIFEN FUMARATE 0.35 MG/ML
1 SOLUTION/ DROPS OPHTHALMIC 2 TIMES DAILY
Qty: 5 ML | Refills: 0 | Status: SHIPPED | OUTPATIENT
Start: 2024-05-22

## 2024-05-22 RX ORDER — CETIRIZINE HYDROCHLORIDE, PSEUDOEPHEDRINE HYDROCHLORIDE 5; 120 MG/1; MG/1
1 TABLET, FILM COATED, EXTENDED RELEASE ORAL 2 TIMES DAILY
Qty: 30 TABLET | Refills: 0 | Status: SHIPPED | OUTPATIENT
Start: 2024-05-22

## 2024-05-22 NOTE — PROGRESS NOTES
"Chief Complaint  Sore Throat, URI, and Nasal Congestion (Symptoms started Fri)    Subjective          History of Present Illness  Fish Paris is here today with URI symptoms    Patient states that he started feeling ill about 5 days ago and the symptoms hit him like a ton of bricks.  He states that he started with a sore throat cough and chills.  He states that he is felt exhausted.  He states that the next day he had no further sore throat but had more drainage as well as coughing.  He states that his eyes have felt very irritated.  He states he is feeling a little better today.  He states that he has had some postnasal drainage and runny nose.  He states that the cough is woken up a couple of times at night but is sleeping.  He has not had a headache.  He states that he has a lot of nasal congestion.  He denies any nausea or vomiting.  He states that he took DayQuil a couple of times which did not seem to help much.    Patient states on his left arm he has a small bump that hesitancy to fall off and grows back.  He states has been going on for 3 years.      Objective   Vital Signs:   /72   Pulse 87   Ht 180.3 cm (71\")   Wt (!) 138 kg (305 lb)   SpO2 97%   BMI 42.54 kg/m²     Body mass index is 42.54 kg/m².  Class 3 Severe Obesity (BMI >=40). Obesity-related health conditions include the following: GERD. Obesity is unchanged. BMI is is above average; no BMI management plan is appropriate. We discussed Information on healthy weight added to patient's after visit summary.      Review of Systems      Current Outpatient Medications:     omeprazole (priLOSEC) 40 MG capsule, Take 1 capsule by mouth once daily, Disp: 30 capsule, Rfl: 0    cetirizine-pseudoephedrine (ZyrTEC-D) 5-120 MG per 12 hr tablet, Take 1 tablet by mouth 2 (Two) Times a Day., Disp: 30 tablet, Rfl: 0    Ketotifen Fumarate (ZADITOR) 0.035 % solution, Administer 1 drop to both eyes 2 (Two) Times a Day., Disp: 5 mL, Rfl: 0    Allergies: Patient " has no known allergies.    Physical Exam  Vitals and nursing note reviewed.   Constitutional:       General: He is not in acute distress.     Appearance: Normal appearance. He is obese. He is not ill-appearing, toxic-appearing or diaphoretic.   HENT:      Head: Normocephalic and atraumatic.      Right Ear: Tympanic membrane, ear canal and external ear normal.      Left Ear: Tympanic membrane, ear canal and external ear normal.      Nose: Congestion present.      Mouth/Throat:      Mouth: Mucous membranes are moist.      Comments: Clear postnasal drainage  Eyes:      Pupils: Pupils are equal, round, and reactive to light.   Cardiovascular:      Rate and Rhythm: Normal rate and regular rhythm.      Heart sounds: Normal heart sounds.   Pulmonary:      Effort: Pulmonary effort is normal.      Breath sounds: Normal breath sounds.   Musculoskeletal:        Arms:    Neurological:      General: No focal deficit present.      Mental Status: He is alert.   Psychiatric:         Mood and Affect: Mood normal.          Result Review :                   Assessment and Plan    Diagnoses and all orders for this visit:    1. Acute URI (Primary)  -     cetirizine-pseudoephedrine (ZyrTEC-D) 5-120 MG per 12 hr tablet; Take 1 tablet by mouth 2 (Two) Times a Day.  Dispense: 30 tablet; Refill: 0  -     Ketotifen Fumarate (ZADITOR) 0.035 % solution; Administer 1 drop to both eyes 2 (Two) Times a Day.  Dispense: 5 mL; Refill: 0  Discussed with patient that his flu and COVID tests are negative.  He appears to have a head cold and have added Zyrtec-D as well as Zaditor to help with his symptoms.  If he finds that he develops a fever increased cough or discolored drainage she is to call our office.  2. Acute cough  -     POCT SARS-CoV-2 + Flu Antigen DAVID    3. Benign skin growth  -     Ambulatory Referral to Dermatology  Will refer him to dermatology for removal of what looks to be a benign growth.      Follow Up   No follow-ups on  file.  Patient was given instructions and counseling regarding his condition or for health maintenance advice. Please see specific information pulled into the AVS if appropriate.     ENID Zapata  05/22/2024

## 2025-05-29 ENCOUNTER — OFFICE VISIT (OUTPATIENT)
Dept: FAMILY MEDICINE CLINIC | Facility: CLINIC | Age: 25
End: 2025-05-29
Payer: MEDICAID

## 2025-05-29 ENCOUNTER — RESULTS FOLLOW-UP (OUTPATIENT)
Dept: FAMILY MEDICINE CLINIC | Facility: CLINIC | Age: 25
End: 2025-05-29

## 2025-05-29 VITALS
HEIGHT: 72 IN | OXYGEN SATURATION: 95 % | BODY MASS INDEX: 39.82 KG/M2 | WEIGHT: 294 LBS | SYSTOLIC BLOOD PRESSURE: 124 MMHG | TEMPERATURE: 98.5 F | DIASTOLIC BLOOD PRESSURE: 82 MMHG | HEART RATE: 63 BPM

## 2025-05-29 DIAGNOSIS — R50.9 FEVER, UNSPECIFIED FEVER CAUSE: ICD-10-CM

## 2025-05-29 DIAGNOSIS — J06.9 ACUTE URI: Primary | ICD-10-CM

## 2025-05-29 LAB
EXPIRATION DATE: NORMAL
EXPIRATION DATE: NORMAL
FLUAV AG UPPER RESP QL IA.RAPID: NOT DETECTED
FLUBV AG UPPER RESP QL IA.RAPID: NOT DETECTED
INTERNAL CONTROL: NORMAL
INTERNAL CONTROL: NORMAL
Lab: NORMAL
Lab: NORMAL
S PYO AG THROAT QL: NEGATIVE
SARS-COV-2 AG UPPER RESP QL IA.RAPID: NOT DETECTED

## 2025-05-29 PROCEDURE — 87428 SARSCOV & INF VIR A&B AG IA: CPT | Performed by: PHYSICIAN ASSISTANT

## 2025-05-29 PROCEDURE — 87880 STREP A ASSAY W/OPTIC: CPT | Performed by: PHYSICIAN ASSISTANT

## 2025-05-29 PROCEDURE — 99213 OFFICE O/P EST LOW 20 MIN: CPT | Performed by: PHYSICIAN ASSISTANT

## 2025-05-29 RX ORDER — DEXTROMETHORPHAN HYDROBROMIDE AND PROMETHAZINE HYDROCHLORIDE 15; 6.25 MG/5ML; MG/5ML
5 SYRUP ORAL 4 TIMES DAILY PRN
Qty: 118 ML | Refills: 0 | Status: SHIPPED | OUTPATIENT
Start: 2025-05-29 | End: 2025-06-04

## 2025-05-29 RX ORDER — AMOXICILLIN 875 MG/1
875 TABLET, COATED ORAL 2 TIMES DAILY
Qty: 14 TABLET | Refills: 0 | Status: SHIPPED | OUTPATIENT
Start: 2025-05-29 | End: 2025-06-05

## 2025-05-29 NOTE — PROGRESS NOTES
".Chief Complaint  Cough, Vomiting, Fever, and Fatigue    Subjective          History of Present Illness  Fish Paris is here today with his  History of Present Illness  The patient presents for evaluation of fatigue, fever, and dry cough.    He has been experiencing intermittent episodes of illness for approximately a month, characterized by symptoms of fatigue, fever, and a dry cough. His wife was recently diagnosed with pneumonia, and they have both been grappling with a severe cold for the past month. Symptoms include coughing, sneezing, and nasal drainage, which are suggestive of an allergic reaction. He reported a temperature of 101.2 degrees on 05/28/2025. Accompanying these symptoms is a general feeling of malaise and extreme fatigue. He reports a mild headache but no sore throat. He has not observed any yellow or green discharge. The dry cough is predominantly present at night, although it persists throughout the day. He has a known history of allergies and has been experiencing wheezing even prior to the onset of his current illness. He does not frequently seek medical attention. He has been self-medicating with Claritin-D for 12 days and promethazine, which he reports has significantly alleviated his cough.    FAMILY HISTORY  His father had asthma.    Objective   Vital Signs:   /82   Pulse 63   Temp 98.5 °F (36.9 °C) (Oral)   Ht 182.9 cm (72\")   Wt 133 kg (294 lb)   SpO2 95%   BMI 39.87 kg/m²     Body mass index is 39.87 kg/m².  Class 2 Severe Obesity (BMI >=35 and <=39.9). Obesity-related health conditions include the following: GERD. Obesity is unchanged. BMI is is above average; BMI management plan is completed. We discussed Information on healthy weight added to patient's after visit summary.      Review of Systems      Current Outpatient Medications:   •  amoxicillin (AMOXIL) 875 MG tablet, Take 1 tablet by mouth 2 (Two) Times a Day for 7 days., Disp: 14 tablet, Rfl: 0  •  " cetirizine-pseudoephedrine (ZyrTEC-D) 5-120 MG per 12 hr tablet, Take 1 tablet by mouth 2 (Two) Times a Day. (Patient not taking: Reported on 5/29/2025), Disp: 30 tablet, Rfl: 0  •  Ketotifen Fumarate (ZADITOR) 0.035 % solution, Administer 1 drop to both eyes 2 (Two) Times a Day. (Patient not taking: Reported on 5/29/2025), Disp: 5 mL, Rfl: 0  •  omeprazole (priLOSEC) 40 MG capsule, Take 1 capsule by mouth once daily (Patient not taking: Reported on 5/29/2025), Disp: 30 capsule, Rfl: 0  •  promethazine-dextromethorphan (PROMETHAZINE-DM) 6.25-15 MG/5ML syrup, Take 5 mL by mouth 4 (Four) Times a Day As Needed for Cough for up to 6 days., Disp: 118 mL, Rfl: 0    Allergies: Patient has no known allergies.    Physical Exam  Vitals and nursing note reviewed.   Constitutional:       General: He is not in acute distress.     Appearance: Normal appearance. He is not ill-appearing, toxic-appearing or diaphoretic.   HENT:      Head: Normocephalic and atraumatic.      Right Ear: Tympanic membrane, ear canal and external ear normal.      Left Ear: Tympanic membrane, ear canal and external ear normal.      Nose: Congestion present.      Mouth/Throat:      Mouth: Mucous membranes are moist.   Eyes:      Pupils: Pupils are equal, round, and reactive to light.   Cardiovascular:      Rate and Rhythm: Normal rate and regular rhythm.      Heart sounds: Normal heart sounds.   Pulmonary:      Effort: Pulmonary effort is normal.      Breath sounds: Normal breath sounds.   Neurological:      General: No focal deficit present.      Mental Status: He is alert.   Psychiatric:         Mood and Affect: Mood normal.         Behavior: Behavior normal.      Physical Exam  Respiratory: Clear to auscultation, no wheezing, rales or rhonchi    Result Review :          Results  Labs   - COVID-19 test: Negative   - Influenza test: Negative   - Strep test: Negative             Assessment and Plan    Diagnoses and all orders for this visit:    1. Acute  URI (Primary)  -     amoxicillin (AMOXIL) 875 MG tablet; Take 1 tablet by mouth 2 (Two) Times a Day for 7 days.  Dispense: 14 tablet; Refill: 0  -     promethazine-dextromethorphan (PROMETHAZINE-DM) 6.25-15 MG/5ML syrup; Take 5 mL by mouth 4 (Four) Times a Day As Needed for Cough for up to 6 days.  Dispense: 118 mL; Refill: 0  - Reported fever of 101.2°F yesterday morning.  - Physical exam findings show lungs sound clear.  - Differential diagnoses include sinus infection, strep throat, or influenza. Chest x-ray ordered to investigate further.  - Amoxicillin prescribed   2. Fever, unspecified fever cause  -     POC Rapid Strep A  -     POCT SARS-CoV-2 + Flu Antigen DAVID  -     XR Chest PA & Lateral (In Office)      Assessment & Plan  . Medication sent to pharmacy.      Follow Up   No follow-ups on file.  Patient was given instructions and counseling regarding his condition or for health maintenance advice. Please see specific information pulled into the AVS if appropriate.     Patient or patient representative verbalized consent for the use of Ambient Listening during the visit with  ENID Zapata for chart documentation. 5/30/2025  16:53 EDT    ENID Zapata  05/29/2025

## 2025-05-30 NOTE — TELEPHONE ENCOUNTER
Attempted. VM not set up yet. Sent [employer] message with results.         HUB TO RELAY    Please call patient and let him know that his chest xray is clear. How is he feeling?

## 2025-06-25 ENCOUNTER — TELEPHONE (OUTPATIENT)
Dept: FAMILY MEDICINE CLINIC | Facility: CLINIC | Age: 25
End: 2025-06-25

## 2025-06-25 NOTE — TELEPHONE ENCOUNTER
Caller: ines iniguez    Relationship to patient: Emergency Contact    Best call back number: [phone]     Chief complaint: CONFUSION, NOT ABLE TO FOCUS, COULDN'T FORM SENTENCES     Patient directed to call 911 or go to their nearest emergency room.     Patient verbalized understanding: [x] Yes  [] No  If no, why?    Additional notes:

## 2025-06-25 NOTE — TELEPHONE ENCOUNTER
Called patient and spoke to him. He states the symptoms happened last night and now he is just sore? I instructed him to seek ER for further evaluation. Pt states he is going once he gets home.

## 2025-07-07 ENCOUNTER — OFFICE VISIT (OUTPATIENT)
Dept: FAMILY MEDICINE CLINIC | Facility: CLINIC | Age: 25
End: 2025-07-07
Payer: MEDICAID

## 2025-07-07 VITALS
HEIGHT: 72 IN | BODY MASS INDEX: 39.28 KG/M2 | DIASTOLIC BLOOD PRESSURE: 80 MMHG | TEMPERATURE: 99.1 F | OXYGEN SATURATION: 96 % | HEART RATE: 103 BPM | SYSTOLIC BLOOD PRESSURE: 126 MMHG | WEIGHT: 290 LBS

## 2025-07-07 DIAGNOSIS — J06.9 ACUTE URI: Primary | ICD-10-CM

## 2025-07-07 PROCEDURE — 87428 SARSCOV & INF VIR A&B AG IA: CPT | Performed by: PHYSICIAN ASSISTANT

## 2025-07-07 PROCEDURE — 87880 STREP A ASSAY W/OPTIC: CPT | Performed by: PHYSICIAN ASSISTANT

## 2025-07-07 PROCEDURE — 99213 OFFICE O/P EST LOW 20 MIN: CPT | Performed by: PHYSICIAN ASSISTANT

## 2025-07-07 RX ORDER — CEFDINIR 300 MG/1
300 CAPSULE ORAL 2 TIMES DAILY
Qty: 14 CAPSULE | Refills: 0 | Status: SHIPPED | OUTPATIENT
Start: 2025-07-07

## 2025-07-07 NOTE — PROGRESS NOTES
"Chief Complaint  Cough (Coughing for a week )    Subjective          Fish Paris presents to [facility]  History of Present Illness  Patient in today for evaluation on cough and congestion symptoms for the past week.  He denies any known fever.  He denies any vomiting or diarrhea.  States he has had minimal nasal congestion.  He states he did have a cough earlier this summer and had a negative chest x-ray at that time.  He states those cough symptoms did go away.  This has just shown appear in the last week- describes his cough as often dry .  He states he has also been taking some over-the-counter allergy medications this past week.  Cough  Chronicity:  New  Onset:  In the past 7 days  Associated symptoms: nasal congestion    Associated symptoms: no chest pain, no chills, no fever, no heartburn, no shortness of breath, no sore throat and no wheezing        Objective   Vital Signs:   /80   Pulse 103   Temp 99.1 °F (37.3 °C)   Ht 182.9 cm (72\")   Wt 132 kg (290 lb)   SpO2 96%   BMI 39.33 kg/m²     Body mass index is 39.33 kg/m².    Review of Systems   Constitutional:  Negative for chills and fever.   HENT:  Positive for congestion. Negative for sore throat.    Respiratory:  Positive for cough. Negative for shortness of breath and wheezing.    Cardiovascular:  Negative for chest pain.   Gastrointestinal:  Negative for abdominal pain, nausea and vomiting.   Neurological:  Negative for dizziness and headache.       Past History:  Medical History: has no past medical history on file.   Surgical History: has no past surgical history on file.   Family History: family history includes Depression in his father and mother; Hyperlipidemia in his father and mother; Hypertension in his father; Thyroid disease in his mother.   Social History: reports that he has been smoking electronic cigarette and cigarettes. He started smoking about 10 years ago. He has a 5.3 pack-year smoking " history. He has never used smokeless tobacco. He reports that he does not currently use alcohol. He reports current drug use. Drug: Marijuana.    No current outpatient medications on file.  Allergies: Patient has no known allergies.    Physical Exam  Constitutional:       Appearance: Normal appearance.   HENT:      Right Ear: Tympanic membrane normal.      Left Ear: Tympanic membrane normal.      Mouth/Throat:      Pharynx: Oropharynx is clear.   Eyes:      Conjunctiva/sclera: Conjunctivae normal.      Pupils: Pupils are equal, round, and reactive to light.   Cardiovascular:      Rate and Rhythm: Normal rate and regular rhythm.      Heart sounds: Normal heart sounds.   Pulmonary:      Effort: Pulmonary effort is normal.      Breath sounds: Normal breath sounds.   Neurological:      Mental Status: He is oriented to person, place, and time.   Psychiatric:         Mood and Affect: Mood normal.         Behavior: Behavior normal.             Assessment and Plan   Diagnoses and all orders for this visit:    1. Acute URI (Primary)  Rapid flu and covid tests were negative; with persistent symptoms will start on antibiotic- discussed possible side effects; recommend expectorant as needed along with good hydration; monitor symptoms closely and if persist to rtc for further evaluation or to ER for any acute worsening symptoms if needed           Follow Up   No follow-ups on file.  Patient was given instructions and counseling regarding his condition or for health maintenance advice. Please see specific information pulled into the AVS if appropriate.     Linda Fraire PA-C

## 2025-07-08 ENCOUNTER — PATIENT MESSAGE (OUTPATIENT)
Dept: FAMILY MEDICINE CLINIC | Facility: CLINIC | Age: 25
End: 2025-07-08
Payer: MEDICAID

## 2025-07-22 ENCOUNTER — OFFICE VISIT (OUTPATIENT)
Dept: FAMILY MEDICINE CLINIC | Facility: CLINIC | Age: 25
End: 2025-07-22
Payer: MEDICAID

## 2025-07-22 VITALS
SYSTOLIC BLOOD PRESSURE: 118 MMHG | HEIGHT: 72 IN | DIASTOLIC BLOOD PRESSURE: 80 MMHG | HEART RATE: 83 BPM | BODY MASS INDEX: 39.68 KG/M2 | OXYGEN SATURATION: 96 % | WEIGHT: 293 LBS

## 2025-07-22 DIAGNOSIS — H66.90 ACUTE OTITIS MEDIA, UNSPECIFIED OTITIS MEDIA TYPE: Primary | ICD-10-CM

## 2025-07-22 PROCEDURE — 99213 OFFICE O/P EST LOW 20 MIN: CPT | Performed by: PHYSICIAN ASSISTANT

## 2025-07-22 RX ORDER — BENZONATATE 100 MG/1
CAPSULE ORAL
COMMUNITY

## 2025-07-22 RX ORDER — METHYLPREDNISOLONE 4 MG/1
TABLET ORAL
COMMUNITY
Start: 2025-07-08 | End: 2025-07-22

## 2025-07-22 NOTE — PROGRESS NOTES
"Chief Complaint  Earache (Went to [facility] 2 weeks ago for infected ear was given abx but  can't hear out of left ear )    Subjective          Fish Paris presents to [facility]  History of Present Illness  Patient in today for evaluation on fullness to left ear that has affected his hearing.  was having pain to this area and went to [facility] and was given augmentin which he finished 2 days ago. States now only minimal discomfort but fullness still present. Denies fever. Previously had cough/congestion and also was given round of steroids- states that has improved. Denies previous issues with recurrent ear infections.   Earache  Affected ear:  Left  Chronicity:  New  Onset:  1 to 4 weeks ago  Progression since onset:  Unchanged  Frequency:  Constantly  Fever:  No fever  Associated symptoms: hearing loss    Associated symptoms: no dizziness, no cough, no sore throat and no congestion    Treatments tried:  Antibiotics  Improvement on treatment:  Mild      Objective   Vital Signs:   /80   Pulse 83   Ht 182.9 cm (72\")   Wt 133 kg (293 lb)   SpO2 96%   BMI 39.74 kg/m²     Body mass index is 39.74 kg/m².    Review of Systems   Constitutional:  Negative for fever.   HENT:  Positive for ear pain and hearing loss. Negative for congestion and sore throat.    Respiratory:  Negative for cough and shortness of breath.    Cardiovascular:  Negative for chest pain.   Gastrointestinal:  Negative for abdominal pain, diarrhea, nausea and vomiting.   Neurological:  Negative for dizziness and headache.       Past History:  Medical History: has no past medical history on file.   Surgical History: has no past surgical history on file.   Family History: family history includes Depression in his father and mother; Hyperlipidemia in his father and mother; Hypertension in his father; Thyroid disease in his mother.   Social History: reports that he has been smoking electronic cigarette and " cigarettes. He started smoking about 10 years ago. He has a 5.3 pack-year smoking history. He has never used smokeless tobacco. He reports that he does not currently use alcohol. He reports current drug use. Drug: Marijuana.      Current Outpatient Medications:     benzonatate (TESSALON) 100 MG capsule, , Disp: , Rfl:   Allergies: Patient has no known allergies.    Physical Exam  Constitutional:       Appearance: Normal appearance.   HENT:      Right Ear: Tympanic membrane, ear canal and external ear normal.      Left Ear: Ear canal and external ear normal.      Ears:      Comments: Erythematous left TM with slight bulging of TM noted.      Mouth/Throat:      Mouth: Mucous membranes are moist.   Eyes:      Pupils: Pupils are equal, round, and reactive to light.   Cardiovascular:      Rate and Rhythm: Normal rate and regular rhythm.      Heart sounds: Normal heart sounds.   Pulmonary:      Effort: Pulmonary effort is normal.      Breath sounds: Normal breath sounds.   Neurological:      Mental Status: He is alert and oriented to person, place, and time.   Psychiatric:         Mood and Affect: Mood normal.         Behavior: Behavior normal.             Assessment and Plan   Diagnoses and all orders for this visit:    1. Acute otitis media, unspecified otitis media type (Primary)  He states still has prescription left of cefdinir that was previously given- will start back on it twice a day and will let me know how things are going over next 48-72 hrs; if not improving will get in with  ENT for further evaluation. Monitor symptoms closely and rtc prn.           Follow Up   No follow-ups on file.  Patient was given instructions and counseling regarding his condition or for health maintenance advice. Please see specific information pulled into the AVS if appropriate.     Linda Fraire PA-C

## 2025-07-25 RX ORDER — CEFDINIR 300 MG/1
300 CAPSULE ORAL 2 TIMES DAILY
Qty: 14 CAPSULE | Refills: 0 | Status: SHIPPED | OUTPATIENT
Start: 2025-07-25